# Patient Record
(demographics unavailable — no encounter records)

---

## 2024-11-01 NOTE — ASSESSMENT
[FreeTextEntry1] : I offered L3-4 laminectomy (mobile on flex ex) with extension of fusion.  He wishes to exhaust more conservative measures and will return in 6 months for follow-up

## 2024-11-01 NOTE — REASON FOR VISIT
[Follow-Up: _____] : a [unfilled] follow-up visit [FreeTextEntry1] : Mr. Wilson is s/p L4-S1 fusion in 2020.  He did well.  He then developed pain in his anterior thighs.  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  He has had extensive physical therapy and injections.  HE is on gabapentin.  Conservative management helped until recently.

## 2024-11-29 NOTE — DISCUSSION/SUMMARY
[de-identified] : A discussion regarding available pain management treatment options occurred with the patient. These included interventional, rehabilitative, pharmacological, and alternative modalities. We will proceed with the following:   Interventional/ Procedures: 1. Proceed with L3-4 LESI NO MAC  - Treatment options were discussed with the patient. The patient has been having persistent lower back and lumbar radicular pain with minimal improvement with conservative therapies. Given that the patient has severe pain and failed conservative treatment, the patient was given the option to proceed with a lumbar epidural steroid injection to try to get some pain relief. - The risks and benefits were discussed which included bleeding, infection, nerve injury, no pain relief or worse, increased pain. All questions were answered and concerns addressed.   Medication/pharmacological: 1. Ordered  mg BID PRN  - I refilled a 30-day supply today. - Pt was advised to take the medication daily for 15 days, then take a one-week break, following that they could take it PRN. - I advised the patient that the NSAID should be taken with food. In addition, while taking the prescribed NSAID, no over the counter or other NSAIDs should be used, such as ibuprofen (Motrin or Advil) or naproxen (Aleve) as this can cause stomach upset or other side effects. If needed for fever or breakthrough pain Tylenol can be used. 2. Ordered Cyclobenzaprine 10 TID PRN   - We are prescribing a muscle relaxant medication to help the patient's muscle spasm pain. The patient understands to not take this medication before driving or operating any heavy machinery as it can be sedating.   Rehabilitative/alternative modalities: 1. Initiate physician directed activity and lifestyle modifications. 2. Participation in active HEP was discussed and printed. 3. Patient was advised to stay away from any heavy lifting. If needed, she was advised to squat and not bend forward.      Total clinician time spent today on the patient is 30 minutes including preparing to see the patient, obtaining and/or reviewing and confirming history, performing medically necessary and appropriate examination, counseling and educating the patient and/or family, documenting clinical information in the EHR and communicating and/or referring to other healthcare professionals.  F/u 2 weeks post injection   DEEPTHI Muro DO

## 2024-11-29 NOTE — DISCUSSION/SUMMARY
[de-identified] : A discussion regarding available pain management treatment options occurred with the patient. These included interventional, rehabilitative, pharmacological, and alternative modalities. We will proceed with the following:   Interventional/ Procedures: 1. Proceed with L3-4 LESI NO MAC  - Treatment options were discussed with the patient. The patient has been having persistent lower back and lumbar radicular pain with minimal improvement with conservative therapies. Given that the patient has severe pain and failed conservative treatment, the patient was given the option to proceed with a lumbar epidural steroid injection to try to get some pain relief. - The risks and benefits were discussed which included bleeding, infection, nerve injury, no pain relief or worse, increased pain. All questions were answered and concerns addressed.   Medication/pharmacological: 1. Ordered  mg BID PRN  - I refilled a 30-day supply today. - Pt was advised to take the medication daily for 15 days, then take a one-week break, following that they could take it PRN. - I advised the patient that the NSAID should be taken with food. In addition, while taking the prescribed NSAID, no over the counter or other NSAIDs should be used, such as ibuprofen (Motrin or Advil) or naproxen (Aleve) as this can cause stomach upset or other side effects. If needed for fever or breakthrough pain Tylenol can be used. 2. Ordered Cyclobenzaprine 10 TID PRN   - We are prescribing a muscle relaxant medication to help the patient's muscle spasm pain. The patient understands to not take this medication before driving or operating any heavy machinery as it can be sedating.   Rehabilitative/alternative modalities: 1. Initiate physician directed activity and lifestyle modifications. 2. Participation in active HEP was discussed and printed. 3. Patient was advised to stay away from any heavy lifting. If needed, she was advised to squat and not bend forward.      Total clinician time spent today on the patient is 30 minutes including preparing to see the patient, obtaining and/or reviewing and confirming history, performing medically necessary and appropriate examination, counseling and educating the patient and/or family, documenting clinical information in the EHR and communicating and/or referring to other healthcare professionals.  F/u 2 weeks post injection   DEEPTHI Muro DO

## 2024-11-29 NOTE — PHYSICAL EXAM
[de-identified] :   Lumbar Spine Exam:   Inspection: Erythema: (-) Ecchymosis: (-) Rashes: (-)   Alignment: No spine misalignment, scoliosis, or Kyphosis Elevated right rib hump with forward bending: (-) Elevated left rib hump with forward bending: (-) RT/LT scapula winging (-)   Palpation: Midline lumbar tenderness: (+) Paraspinal tenderness present: Positive bilaterally   Lumbar ROM  Limited ROM and pain with Extension   Strength: Hip Flexion: R (5/5) L (5/5) Knee extension: R (5/5) L (5/5) Knee flexion: R (5/5) L (5/5) Ankle Dorsiflexion: R (5/5) L(5/5) EHL: R (5/5) L (5/5) Ankle Plantarflexion: R (5/5) L (5/5)   Special Tests: - Positive Slump test-positive bilaterally - +Facet loading/Extension Rotation Test B/l - RUDY test: Negative bilaterally   Neurologic: Light touch intact throughout LE Reflexes normal and symmetric   Gait: Normal gait, stable, walks without assistance. Normal toe and heel walking. No signs of foot drop, Drag and slap test (-)

## 2024-11-29 NOTE — PHYSICAL EXAM
[de-identified] :   Lumbar Spine Exam:   Inspection: Erythema: (-) Ecchymosis: (-) Rashes: (-)   Alignment: No spine misalignment, scoliosis, or Kyphosis Elevated right rib hump with forward bending: (-) Elevated left rib hump with forward bending: (-) RT/LT scapula winging (-)   Palpation: Midline lumbar tenderness: (+) Paraspinal tenderness present: Positive bilaterally   Lumbar ROM  Limited ROM and pain with Extension   Strength: Hip Flexion: R (5/5) L (5/5) Knee extension: R (5/5) L (5/5) Knee flexion: R (5/5) L (5/5) Ankle Dorsiflexion: R (5/5) L(5/5) EHL: R (5/5) L (5/5) Ankle Plantarflexion: R (5/5) L (5/5)   Special Tests: - Positive Slump test-positive bilaterally - +Facet loading/Extension Rotation Test B/l - RUDY test: Negative bilaterally   Neurologic: Light touch intact throughout LE Reflexes normal and symmetric   Gait: Normal gait, stable, walks without assistance. Normal toe and heel walking. No signs of foot drop, Drag and slap test (-)

## 2024-11-29 NOTE — HISTORY OF PRESENT ILLNESS
[FreeTextEntry1] : Patient is a 57-year-old male who is here today to establish care for lower back pain, he is accompanied by his wife, is seeing us under Worker's Comp. Patient states that on 11/21/2024 he was fixing a pipe at his work, and he was bending forward as he was working, he felt like his back "gave out", at the time patient went home took an Advil however his pain continued to be severe, radicular feature that goes down his left buttock area.  Pain is associated with numbness and tingling, pain is severe in nature and it is limiting his daily activities, pain is 8 out of 10 on the pain scale today. The following day on 11/22/2024 patient went to send at [facility] where he was given a Toradol shot and a liquid steroid which gave him no relief.   Patient has a history of L4-S1 fusion in 2020 with Dr. 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

## 2024-11-29 NOTE — HISTORY OF PRESENT ILLNESS
[FreeTextEntry1] : Patient is a 57-year-old male who is here today to establish care for lower back pain, he is accompanied by his wife, is seeing us under Worker's Comp. Patient states that on 11/21/2024 he was fixing a pipe at his work, and he was bending forward as he was working, he felt like his back "gave out", at the time patient went home took an Advil however his pain continued to be severe, radicular feature that goes down his left buttock area.  Pain is associated with numbness and tingling, pain is severe in nature and it is limiting his daily activities, pain is 8 out of 10 on the pain scale today. The following day on 11/22/2024 patient went to send at [facility] where he was given a Toradol shot and a liquid steroid which gave him no relief.   Patient has a history of L4-S1 fusion in 2020 with Dr. 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

## 2025-03-07 NOTE — DISCUSSION/SUMMARY
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

## 2025-03-07 NOTE — REASON FOR VISIT
[Follow-Up] : a follow-up visit [Sleep Apnea] : sleep apnea [COPD] : COPD [TextBox_44] : PLEURAL EFF[place]ON

## 2025-03-07 NOTE — HISTORY OF PRESENT ILLNESS
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

## 2025-03-10 NOTE — HISTORY OF PRESENT ILLNESS
[Current] : Current [>=20 Pack-Years] : Twenty pack years or greater smoking history: Yes [TextBox_13] : Referred by Dr. Goncalves Visit conducted with wife Ching FINNEY  is a 58 year old man with a history of COPD, JEFF, Melanoma Forehead S/p Excision and LN in neck early 2000s .  He  was seen in the office by Dr. Goncalves for review of eligibility for, as well as, discussion of Low-Dose CT lung cancer screening program. Over the telephone today we reviewed and confirmed that the patient meets screening eligibility criteria:  Mr. FINNEY denies any signs or symptoms of lung cancer including new cough, change in cough, hemoptysis and unintentional weight loss.   Mr. FINNEY denies any personal history of lung cancer. No lung cancer in a 1st degree relative. Denies any history of lung disease. Denies any history of occupational exposures.  [PacksperDay] : 1 [N_Years] : 25 25 [PacksperYear] : 25

## 2025-03-10 NOTE — PLAN
[FreeTextEntry1] : Plan: -Low Dose CT chest for lung cancer screening -Follow up with patient and his referring provider after his LDCT results have been reviewed by the multi-disciplinary clinical team -Encouraged smoking cessation   Engaged in shared decision making with [facility] . Answered all questions. He verbalized understanding and agreement. He knows to call back with any questions or concerns

## 2025-03-31 NOTE — PHYSICAL EXAM
[de-identified] :   Lumbar Spine Exam:   Inspection: Erythema: (-) Ecchymosis: (-) Rashes: (-)   Alignment: No spine misalignment, scoliosis, or Kyphosis Elevated right rib hump with forward bending: (-) Elevated left rib hump with forward bending: (-) RT/LT scapula winging (-)   Palpation: Midline lumbar tenderness: (+) Paraspinal tenderness present: Positive bilaterally   Lumbar ROM  Limited ROM and pain with Extension   Strength: Hip Flexion: R (5/5) L (5/5) Knee extension: R (5/5) L (5/5) Knee flexion: R (5/5) L (5/5) Ankle Dorsiflexion: R (5/5) L(5/5) EHL: R (5/5) L (5/5) Ankle Plantarflexion: R (5/5) L (5/5)   Special Tests: - Positive Slump test-positive bilaterally - +Facet loading/Extension Rotation Test B/l - RUDY test: Negative bilaterally   Neurologic: Light touch intact throughout LE Reflexes normal and symmetric   Gait: Normal gait, stable, walks without assistance. Normal toe and heel walking. No signs of foot drop, Drag and slap test (-)

## 2025-03-31 NOTE — DATA REVIEWED
[FreeTextEntry1] : MRI of the lumbar spine taken @ [facility] on 2- IMPRESSION: No significant change in comparison to prior MRI of 1/2/2023. Stable postsurgical changes status post L4-S1 posterior fusion and laminectomy changes. Stable grade 1 anterolisthesis of L3 on L4 contributing to severe spinal stenosis as well as foraminal narrowing. Left paracentral disc herniation at L5-S1 causing mass effect upon the exiting L5 nerve root.

## 2025-03-31 NOTE — DISCUSSION/SUMMARY
[de-identified] : A discussion regarding available pain management treatment options occurred with the patient. These included interventional, rehabilitative, pharmacological, and alternative modalities. We will proceed with the following:   Interventional treatment options: - None indicated at this time.   Imagin. MRI/CT scan lumbar spine w/o contrast to evaluate for anatomic changes of the lumbar discs, nerves, and surrounding tissue that will help provide information to accurately diagnose the patient's cause of pain and therefore treat said pain generator in the most effective way possible - whether that be specific physical therapy recommendations, medications, and/or interventional therapies. (for additional surgical planning)   Medications: 1. Ordered Methylprednisolone 21-tablet, 6-day taper pack.  I advised Mr. Wilson that the steroid should be taken with food.  In addition, while taking the prescribed steroid, no over the counter or other NSAIDs should be used, such as ibuprofen (Motrin or Advil) or naproxen (Aleve) as this can cause stomach upset or other side effects.  If needed for fever or breakthrough pain Tylenol can be used.    Complementary treatment options: - lifestyle modifications discussed - avoid bending and bend with knees - avoid heavy lifting   Disability status: Temporary total disability. Patient is unable to return to work at this time.   - Follow up in 6-8 weeks.   I Ching Shepherd attest that this documentation has been prepared under the direction and in the presence of provider Dr. Tony Mina.  The documentation recorded by the scribe in my presence, accurately reflects the service I personally performed, and the decisions made by me with my edits as appropriate.   Tony Mina, DO

## 2025-03-31 NOTE — HISTORY OF PRESENT ILLNESS
[FreeTextEntry1] : Patient is a 57-year-old male who is here today to establish care for lower back pain, he is accompanied by his wife, is seeing us under Worker's Comp. Patient states that on 11/21/2024 he was fixing a pipe at his work, and he was bending forward as he was working, he felt like his back "gave out", at the time patient went home took an Advil however his pain continued to be severe, radicular feature that goes down his left buttock area.  Pain is associated with numbness and tingling, pain is severe in nature and it is limiting his daily activities, pain is 8 out of 10 on the pain scale today. The following day on 11/22/2024 patient went to send at [facility] where he was given a Toradol shot and a liquid steroid which gave him no relief.   Patient has a history of L4-S1 fusion in 2020 with Dr. 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  2-4-2025: Revisit encounter. He continues to be seen under an active workers compensation case. He is here today to get paperwork filled out for his job. He continues to experience pain in the lower lumbar spine. His pain is made worse with any sort of weight bearing, standing/walking. There is pain when bending forward. He rates the pain at a 6/10 on the pain scale. He has yet to return back to work. He has yet to start physical therapy as he is still awaiting approval by workers compensation.   3-: Revisit encounter. He is accompanied by his wife today. He continues to be seen under an active workers compensation case. He is presenting today with his baseline lower back pain. He denies any worsening pain or any flare ups. His pain is made worse with any sort of weight bearing, standing/walking. There is pain when bending forward. He rates the pain at a 6/10 on the pain scale.

## 2025-03-31 NOTE — HISTORY OF PRESENT ILLNESS
[FreeTextEntry1] : Patient is a 57-year-old male who is here today to establish care for lower back pain, he is accompanied by his wife, is seeing us under Worker's Comp. Patient states that on 11/21/2024 he was fixing a pipe at his work, and he was bending forward as he was working, he felt like his back "gave out", at the time patient went home took an Advil however his pain continued to be severe, radicular feature that goes down his left buttock area.  Pain is associated with numbness and tingling, pain is severe in nature and it is limiting his daily activities, pain is 8 out of 10 on the pain scale today. The following day on 11/22/2024 patient went to send at [facility] where he was given a Toradol shot and a liquid steroid which gave him no relief.   Patient has a history of L4-S1 fusion in 2020 with Dr. 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  2-4-2025: Revisit encounter. He continues to be seen under an active workers compensation case. He is here today to get paperwork filled out for his job. He continues to experience pain in the lower lumbar spine. His pain is made worse with any sort of weight bearing, standing/walking. There is pain when bending forward. He rates the pain at a 6/10 on the pain scale. He has yet to return back to work. He has yet to start physical therapy as he is still awaiting approval by workers compensation.   3-: Revisit encounter. He is accompanied by his wife today. He continues to be seen under an active workers compensation case. He is presenting today with his baseline lower back pain. He denies any worsening pain or any flare ups. His pain is made worse with any sort of weight bearing, standing/walking. There is pain when bending forward. He rates the pain at a 6/10 on the pain scale.

## 2025-03-31 NOTE — PHYSICAL EXAM
[de-identified] :   Lumbar Spine Exam:   Inspection: Erythema: (-) Ecchymosis: (-) Rashes: (-)   Alignment: No spine misalignment, scoliosis, or Kyphosis Elevated right rib hump with forward bending: (-) Elevated left rib hump with forward bending: (-) RT/LT scapula winging (-)   Palpation: Midline lumbar tenderness: (+) Paraspinal tenderness present: Positive bilaterally   Lumbar ROM  Limited ROM and pain with Extension   Strength: Hip Flexion: R (5/5) L (5/5) Knee extension: R (5/5) L (5/5) Knee flexion: R (5/5) L (5/5) Ankle Dorsiflexion: R (5/5) L(5/5) EHL: R (5/5) L (5/5) Ankle Plantarflexion: R (5/5) L (5/5)   Special Tests: - Positive Slump test-positive bilaterally - +Facet loading/Extension Rotation Test B/l - RUDY test: Negative bilaterally   Neurologic: Light touch intact throughout LE Reflexes normal and symmetric   Gait: Normal gait, stable, walks without assistance. Normal toe and heel walking. No signs of foot drop, Drag and slap test (-)

## 2025-03-31 NOTE — DISCUSSION/SUMMARY
[de-identified] : A discussion regarding available pain management treatment options occurred with the patient. These included interventional, rehabilitative, pharmacological, and alternative modalities. We will proceed with the following:   Interventional treatment options: - None indicated at this time.   Imagin. MRI/CT scan lumbar spine w/o contrast to evaluate for anatomic changes of the lumbar discs, nerves, and surrounding tissue that will help provide information to accurately diagnose the patient's cause of pain and therefore treat said pain generator in the most effective way possible - whether that be specific physical therapy recommendations, medications, and/or interventional therapies. (for additional surgical planning)   Medications: 1. Ordered Methylprednisolone 21-tablet, 6-day taper pack.  I advised Mr. Wilson that the steroid should be taken with food.  In addition, while taking the prescribed steroid, no over the counter or other NSAIDs should be used, such as ibuprofen (Motrin or Advil) or naproxen (Aleve) as this can cause stomach upset or other side effects.  If needed for fever or breakthrough pain Tylenol can be used.    Complementary treatment options: - lifestyle modifications discussed - avoid bending and bend with knees - avoid heavy lifting   Disability status: Temporary total disability. Patient is unable to return to work at this time.   - Follow up in 6-8 weeks.   I Ching Shepherd attest that this documentation has been prepared under the direction and in the presence of provider Dr. Tony Mina.  The documentation recorded by the scribe in my presence, accurately reflects the service I personally performed, and the decisions made by me with my edits as appropriate.   Tony Mina, DO